# Patient Record
Sex: MALE | Race: WHITE | NOT HISPANIC OR LATINO | URBAN - METROPOLITAN AREA
[De-identification: names, ages, dates, MRNs, and addresses within clinical notes are randomized per-mention and may not be internally consistent; named-entity substitution may affect disease eponyms.]

---

## 2018-09-27 ENCOUNTER — OFFICE VISIT (OUTPATIENT)
Dept: URBAN - METROPOLITAN AREA CLINIC 18 | Facility: CLINIC | Age: 78
End: 2018-09-27
Payer: COMMERCIAL

## 2018-09-27 DIAGNOSIS — H35.3131 NONEXUDATIVE AGE-RELATED MACULAR DEGENERATION, BILATERAL, EARLY DRY STAGE: Primary | ICD-10-CM

## 2018-09-27 DIAGNOSIS — H04.123 DRY EYE SYNDROME OF BILATERAL LACRIMAL GLANDS: ICD-10-CM

## 2018-09-27 DIAGNOSIS — H26.491 OTHER SECONDARY CATARACT, RIGHT EYE: ICD-10-CM

## 2018-09-27 DIAGNOSIS — Z96.1 PRESENCE OF INTRAOCULAR LENS: ICD-10-CM

## 2018-09-27 PROCEDURE — 92014 COMPRE OPH EXAM EST PT 1/>: CPT | Performed by: OPTOMETRIST

## 2018-09-27 ASSESSMENT — KERATOMETRY
OS: 42.50
OD: 43.13

## 2018-09-27 ASSESSMENT — INTRAOCULAR PRESSURE
OD: 12
OS: 12

## 2018-09-27 NOTE — IMPRESSION/PLAN
Impression: Presence of intraocular lens: Z96.1. OU. Plan: IOL(s) in good position.  Will continue to monitor yearly

## 2018-09-27 NOTE — IMPRESSION/PLAN
Impression: Other secondary cataract, right eye: H26.491. Plan: Will continue to observe condition and or symptoms. No treatment is required at this time.